# Patient Record
Sex: MALE | Race: WHITE | Employment: FULL TIME | URBAN - METROPOLITAN AREA
[De-identification: names, ages, dates, MRNs, and addresses within clinical notes are randomized per-mention and may not be internally consistent; named-entity substitution may affect disease eponyms.]

---

## 2017-03-01 ENCOUNTER — HOSPITAL ENCOUNTER (EMERGENCY)
Facility: CLINIC | Age: 27
Discharge: HOME | End: 2017-03-01
Attending: FAMILY MEDICINE | Admitting: FAMILY MEDICINE
Payer: MEDICAID

## 2017-03-01 VITALS
RESPIRATION RATE: 14 BRPM | BODY MASS INDEX: 23.53 KG/M2 | HEART RATE: 84 BPM | OXYGEN SATURATION: 98 % | TEMPERATURE: 98 F | SYSTOLIC BLOOD PRESSURE: 131 MMHG | DIASTOLIC BLOOD PRESSURE: 70 MMHG | WEIGHT: 164 LBS

## 2017-03-01 DIAGNOSIS — L02.415 CUTANEOUS ABSCESS OF RIGHT LOWER EXTREMITY: ICD-10-CM

## 2017-03-01 DIAGNOSIS — L73.2 HIDRADENITIS SUPPURATIVA: ICD-10-CM

## 2017-03-01 PROCEDURE — 99283 EMERGENCY DEPT VISIT LOW MDM: CPT | Mod: 25

## 2017-03-01 PROCEDURE — 10060 I&D ABSCESS SIMPLE/SINGLE: CPT | Performed by: FAMILY MEDICINE

## 2017-03-01 PROCEDURE — 10060 I&D ABSCESS SIMPLE/SINGLE: CPT

## 2017-03-01 PROCEDURE — 25000125 ZZHC RX 250: Performed by: FAMILY MEDICINE

## 2017-03-01 PROCEDURE — 99284 EMERGENCY DEPT VISIT MOD MDM: CPT | Mod: 25 | Performed by: FAMILY MEDICINE

## 2017-03-01 RX ORDER — BUPIVACAINE HYDROCHLORIDE AND EPINEPHRINE 2.5; 5 MG/ML; UG/ML
1 INJECTION, SOLUTION INFILTRATION; PERINEURAL ONCE
Status: COMPLETED | OUTPATIENT
Start: 2017-03-01 | End: 2017-03-01

## 2017-03-01 RX ADMIN — BUPIVACAINE HYDROCHLORIDE AND EPINEPHRINE BITARTRATE 1 ML: 2.5; .005 INJECTION, SOLUTION INFILTRATION; PERINEURAL at 16:34

## 2017-03-01 ASSESSMENT — ENCOUNTER SYMPTOMS
WHEEZING: 0
SORE THROAT: 0
CHILLS: 0
BACK PAIN: 0
FEVER: 0
ABDOMINAL PAIN: 0

## 2017-03-01 NOTE — ED AVS SNAPSHOT
[facility] Emergency Department    [address] [place] [address]    Phone:  [phone]    Fax:  [phone]                                       Dinesh Weiss   MRN: [id number]    Department:  [facility] Emergency Department   Date of Visit:  3/1/2017           After Visit Summary Signature Page     I have received my discharge instructions, and my questions have been answered. I have discussed any challenges I see with this plan with the nurse or doctor.    ..........................................................................................................................................  Patient/Patient Representative Signature      ..........................................................................................................................................  Patient Representative Print Name and Relationship to Patient    ..................................................               ................................................  Date                                            Time    ..........................................................................................................................................  Reviewed by Signature/Title    ...................................................              ..............................................  Date                                                            Time

## 2017-03-01 NOTE — LETTER
[facility] EMERGENCY DEPARTMENT  [address] [place] [address]  [phone]    2017    Dinesh Weiss  [address]  [place] [address]  [phone] (home)     : 1990      To Whom it may concern:    Dinesh Weiss was seen in our Emergency Department today, 2017.  I expect his condition to improve over the next 1-2 days.  He may return to work/school when improved.    Sincerely,        Syed Rodriguez MD

## 2017-03-01 NOTE — ED PROVIDER NOTES
History     Chief Complaint   Patient presents with     Cyst     HPI  Dinesh Weiss is a 26 year old male who presents to the ED with a painful swelling to the back of his right thigh. Just below the buttock. He has had cysts in this area before which required incision and drainage. He has no fever. He has a long history of this. No injury to this area.    Allergies   Allergen Reactions     Penicillins      No current facility-administered medications for this encounter.      No current outpatient prescriptions on file.                 I have reviewed the Medications, Allergies, Past Medical and Surgical History, and Social History in the Epic system.    Review of Systems   Constitutional: Negative for chills and fever.   HENT: Negative for congestion and sore throat.    Respiratory: Negative for wheezing.    Cardiovascular: Negative for chest pain.   Gastrointestinal: Negative for abdominal pain.   Musculoskeletal: Negative for back pain.   Skin: Negative for rash.       Physical Exam   BP: 123/72  Heart Rate: 88  Temp: 98  F (36.7  C)  Resp: 14  Weight: 74.4 kg (164 lb)  SpO2: 98 %  Physical Exam   Constitutional: He is oriented to person, place, and time. He appears well-developed and well-nourished.   Pleasant 26-year-old who appears very anxious   HENT:   Head: Normocephalic and atraumatic.   Eyes: Conjunctivae are normal.   Neck: Normal range of motion. Neck supple.   Cardiovascular: Normal rate.    Pulmonary/Chest: Effort normal.   Musculoskeletal: Normal range of motion.   Neurological: He is alert and oriented to person, place, and time.   Skin: Skin is warm and dry. There is erythema.         Red Warm swollen 2 cm diameter cutaneous abscess. Draining foul-smelling purulent material after incision and drainage. Numerous scars in the same area from previous abscesses and drainages. No evidence of cellulitis.   Psychiatric: He has a normal mood and affect. His behavior is normal.   Nursing note and  "vitals reviewed.      ED Course     ED Course     Incision + drainage  Date/Time: 3/1/2017 4:32 PM  Performed by: ALEXANDR MONAE  Authorized by: ALEXANDR MONAE   Consent: Verbal consent obtained.  Risks and benefits: risks, benefits and alternatives were discussed  Consent given by: patient  Patient identity confirmed: verbally with patient and hospital-assigned identification number  Time out: Immediately prior to procedure a \"time out\" was called to verify the correct patient, procedure, equipment, support staff and site/side marked as required.  Type: abscess  Body area: lower extremity  Location details: right leg  Anesthesia: local infiltration    Anesthesia:  Anesthesia: local infiltration  Local Anesthetic: bupivacaine 0.25% with epinephrine   Anesthetic total: 3 mL  Sedation:  Patient sedated: no    Scalpel size: 11  Incision type: single straight  Incision depth: subcutaneous  Complexity: simple  Drainage: purulent  Drainage amount: moderate  Wound treatment: wound left open  Patient tolerance: Patient tolerated the procedure well with no immediate complications  Comments: Small cutaneous abscess was incised and drained. 2 small to allow any packing. The opening was made large and left open. Discussed trying to keep this open for the next 2 days to facilitate drainage.                   Critical Care time:  none               Labs Ordered and Resulted from Time of ED Arrival Up to the Time of Departure from the ED - No data to display    Assessments & Plan (with Medical Decision Making)   MDM--26-year-old male with a long history of cutaneous abscesses. These occasionally need incision and drainage. There is one on the back of his right leg just below the right buttock which has been increasing in size redness and pain over the last 2 days. This required incision and drainage that resulted in a moderate amount of foul-smelling purulent material. Scant amount of sebaceous material. The wound was left open. " Patient tolerated this ok.  I have reviewed the nursing notes.    I have reviewed the findings, diagnosis, plan and need for follow up with the patient.    New Prescriptions    No medications on file       Final diagnoses:   Hidradenitis suppurativa   Cutaneous abscess of right lower extremity       3/1/2017   [facility] EMERGENCY DEPARTMENT     Jennifer, Syed MUNGUIA MD  03/01/17 7521

## 2017-03-01 NOTE — ED AVS SNAPSHOT
[facility] Emergency Department    [address] [place] [address]    Phone:  [phone]    Fax:  [phone]                                       Dinesh Weiss   MRN: [id number]    Department:  [facility] Emergency Department   Date of Visit:  3/1/2017           Patient Information     Date Of Birth          1990        Your diagnoses for this visit were:     Hidradenitis suppurativa     Cutaneous abscess of right lower extremity        You were seen by Jennifer, Syed MUNGUIA MD.      Follow-up Information     Follow up with None.        Follow up with [facility] Emergency Department.    Specialty:  EMERGENCY MEDICINE    Why:  If symptoms worsen    Contact information:    [address]   [place] [address] [phone]    Additional information:    From [address]: Exit at [address] on south side of [place]. Turn right on [address]. Turn left at stoplight on [address]. [facility] will be in view two blocks ahead      Discharge References/Attachments     HIDRADENITIS SUPPURATIVA, INCISION AND DRAINAGE (ENGLISH)      24 Hour Appointment Hotline       To make an appointment at any [facility] clinic, call [phone] ([phone]). If you don't have a family doctor or clinic, we will help you find one. [facility] clinics are conveniently located to serve the needs of you and your family.             Review of your medicines      Notice     You have not been prescribed any medications.            Orders Needing Specimen Collection     None      Pending Results     No orders found from 2/27/2017 to 3/2/2017.            Pending Culture Results     No orders found from 2/27/2017 to 3/2/2017.            Thank you for choosing [facility]       Thank you for choosing [facility] for your care. Our goal is always to provide you with excellent care. Hearing back from our patients is one way we can continue to improve our services. Please take a  "few minutes to complete the written survey that you may receive in the mail after you visit with us. Thank you!        [employer] Information     [employer] lets you send messages to your doctor, view your test results, renew your prescriptions, schedule appointments and more. To sign up, go to [email or web address] . Click on \"Log in\" on the left side of the screen, which will take you to the Welcome page. Then click on \"Sign up Now\" on the right side of the page.     You will be asked to enter the access code listed below, as well as some personal information. Please follow the directions to create your username and password.     Your access code is: [email or web address]  Expires: 3/11/2017 11:28 PM     Your access code will  in 90 days. If you need help or a new code, please call your [facility] clinic or [phone].        Care EveryWhere ID     This is your Care EveryWhere ID. This could be used by other organizations to access your [facility] medical records  [id number]        After Visit Summary       This is your record. Keep this with you and show to your community pharmacist(s) and doctor(s) at your next visit.                  "

## 2017-03-01 NOTE — ED NOTES
Presents with abscess to right lower gluteal/upper posterior thigh. First noticed pain two days ago and has gotten worse

## 2021-09-15 ENCOUNTER — HOSPITAL ENCOUNTER (EMERGENCY)
Facility: CLINIC | Age: 31
Discharge: HOME | End: 2021-09-15
Attending: EMERGENCY MEDICINE | Admitting: EMERGENCY MEDICINE
Payer: MEDICAID

## 2021-09-15 VITALS
OXYGEN SATURATION: 98 % | DIASTOLIC BLOOD PRESSURE: 75 MMHG | TEMPERATURE: 98.1 F | BODY MASS INDEX: 24.48 KG/M2 | SYSTOLIC BLOOD PRESSURE: 108 MMHG | HEART RATE: 59 BPM | WEIGHT: 170.6 LBS | RESPIRATION RATE: 16 BRPM

## 2021-09-15 DIAGNOSIS — J02.9 PHARYNGITIS, UNSPECIFIED ETIOLOGY: ICD-10-CM

## 2021-09-15 LAB
DEPRECATED S PYO AG THROAT QL EIA: NEGATIVE
GROUP A STREP BY PCR: NOT DETECTED

## 2021-09-15 PROCEDURE — 99282 EMERGENCY DEPT VISIT SF MDM: CPT | Performed by: EMERGENCY MEDICINE

## 2021-09-15 PROCEDURE — 87651 STREP A DNA AMP PROBE: CPT | Performed by: EMERGENCY MEDICINE

## 2021-09-15 PROCEDURE — 99283 EMERGENCY DEPT VISIT LOW MDM: CPT | Performed by: EMERGENCY MEDICINE

## 2021-09-15 NOTE — ED TRIAGE NOTES
Pt presents with throat pain and swollen glands. Started two days ago. Pt had similar symptoms on 09/03/2021 covid negative then.

## 2021-09-15 NOTE — LETTER
September 15, 2021      To Whom It May Concern:      Dinesh Weiss was seen in our Emergency Department today, 09/15/21. .  He may return to work tomorrow.      Sincerely,        Sathya Hodge MD

## 2021-09-15 NOTE — ED PROVIDER NOTES
History     Chief Complaint   Patient presents with     Throat Pain     HPI  Dinesh Weiss is a 31 year old male who presents with a sore throat. This began 3 days ago. He is had a slight cough over the last 2 weeks as well. Pain this morning in the throat was sharp bilateral and lower. No fever. He had a negative Covid test 12 days ago. He needs a work note for today.    Allergies:  Allergies   Allergen Reactions     Penicillins        Problem List:    There are no problems to display for this patient.       Past Medical History:    No past medical history on file.    Past Surgical History:    Past Surgical History:   Procedure Laterality Date     TONSILLECTOMY         Family History:    No family history on file.    Social History:  Marital Status:  Single [1]  Social History     Tobacco Use     Smoking status: Current Every Day Smoker     Packs/day: 1.00   Substance Use Topics     Alcohol use: No     Comment: Rare     Drug use: No        Medications:    No current outpatient medications on file.        Review of Systems  All other systems are reviewed and are negative    Physical Exam   BP: 108/75  Pulse: 59  Temp: 98.1  F (36.7  C)  Resp: 16  Weight: 77.4 kg (170 lb 9.6 oz)  SpO2: 98 %      Physical Exam  Vitals and nursing note reviewed.   Constitutional:       General: He is not in acute distress.     Appearance: He is well-developed. He is not diaphoretic.   HENT:      Head: Normocephalic and atraumatic.   Eyes:      General: No scleral icterus.        Right eye: No discharge.         Left eye: No discharge.      Conjunctiva/sclera: Conjunctivae normal.   Cardiovascular:      Rate and Rhythm: Normal rate and regular rhythm.      Heart sounds: Normal heart sounds. No murmur heard.     Pulmonary:      Effort: Pulmonary effort is normal. No respiratory distress.      Breath sounds: Normal breath sounds. No stridor.   Abdominal:      Palpations: Abdomen is soft.      Tenderness: There is no abdominal  tenderness.   Musculoskeletal:         General: Normal range of motion.      Cervical back: Normal range of motion and neck supple.   Skin:     General: Skin is warm and dry.      Coloration: Skin is not pale.      Findings: No erythema or rash.   Neurological:      Mental Status: He is alert.      Cranial Nerves: No cranial nerve deficit.      Motor: No abnormal muscle tone.         ED Course        Procedures              Critical Care time:  none               Results for orders placed or performed during the hospital encounter of 09/15/21 (from the past 24 hour(s))   Streptococcus A Rapid Screen w/Reflex to PCR    Specimen: Throat; Swab   Result Value Ref Range    Group A Strep antigen Negative Negative       Medications - No data to display    Assessments & Plan (with Medical Decision Making)  31-year-old with pharyngitis. Strep test negative. Symptomatic care advised.     I have reviewed the nursing notes.    I have reviewed the findings, diagnosis, plan and need for follow up with the patient.       New Prescriptions    No medications on file       Final diagnoses:   Pharyngitis, unspecified etiology       9/15/2021   [facility] EMERGENCY DEPT     Sathya Hodge MD  09/15/21 9358

## 2021-09-16 NOTE — RESULT ENCOUNTER NOTE
Group A Streptococcus PCR is NEGATIVE  No treatment or change in treatment [facility] ED lab result Strep Group A protocol.

## 2021-11-16 ENCOUNTER — HOSPITAL ENCOUNTER (EMERGENCY)
Facility: CLINIC | Age: 31
Discharge: HOME | End: 2021-11-16
Attending: PHYSICIAN ASSISTANT | Admitting: PHYSICIAN ASSISTANT
Payer: MEDICAID

## 2021-11-16 VITALS
OXYGEN SATURATION: 99 % | WEIGHT: 171 LBS | BODY MASS INDEX: 24.54 KG/M2 | HEART RATE: 50 BPM | SYSTOLIC BLOOD PRESSURE: 121 MMHG | TEMPERATURE: 98.3 F | DIASTOLIC BLOOD PRESSURE: 79 MMHG | RESPIRATION RATE: 18 BRPM

## 2021-11-16 DIAGNOSIS — Z20.822 SUSPECTED 2019 NOVEL CORONAVIRUS INFECTION: ICD-10-CM

## 2021-11-16 LAB
FLUAV RNA SPEC QL NAA+PROBE: NEGATIVE
FLUBV RNA RESP QL NAA+PROBE: NEGATIVE
SARS-COV-2 RNA RESP QL NAA+PROBE: NEGATIVE

## 2021-11-16 PROCEDURE — 87636 SARSCOV2 & INF A&B AMP PRB: CPT | Performed by: PHYSICIAN ASSISTANT

## 2021-11-16 PROCEDURE — 99282 EMERGENCY DEPT VISIT SF MDM: CPT | Performed by: PHYSICIAN ASSISTANT

## 2021-11-16 PROCEDURE — 99283 EMERGENCY DEPT VISIT LOW MDM: CPT | Performed by: PHYSICIAN ASSISTANT

## 2021-11-16 PROCEDURE — C9803 HOPD COVID-19 SPEC COLLECT: HCPCS | Performed by: PHYSICIAN ASSISTANT

## 2021-11-16 NOTE — ED PROVIDER NOTES
History     Chief Complaint   Patient presents with     Covid Concern     HPI  Dinesh Weiss is a 31 year old male who presents to the emergency department complaining of Covid symptoms.  He reports for the last 2 to 3 days he has experienced body aches, cough, sore throat, loss of smell, diarrhea, and abdominal soreness.  He has not taken anything for his symptoms.  He denies shortness of breath.  He denies nausea or vomiting.  He states he feels very fatigued and has been unable to go hunting because of it.  He is a smoker but is only smoked a few cigarettes since getting sick.    Allergies:  Allergies   Allergen Reactions     Penicillins        Problem List:    There are no problems to display for this patient.       Past Medical History:    History reviewed. No pertinent past medical history.    Past Surgical History:    Past Surgical History:   Procedure Laterality Date     TONSILLECTOMY         Family History:    History reviewed. No pertinent family history.    Social History:  Marital Status:  Single [1]  Social History     Tobacco Use     Smoking status: Current Every Day Smoker     Packs/day: 1.00     Smokeless tobacco: Never Used   Substance Use Topics     Alcohol use: No     Comment: Rare     Drug use: No        Medications:    No current outpatient medications on file.        Review of Systems   All other systems reviewed and are negative.      Physical Exam   BP: 121/79  Pulse: 50  Temp: 98.3  F (36.8  C)  Resp: 18  Weight: 77.6 kg (171 lb)  SpO2: 99 %      Physical Exam  Vitals and nursing note reviewed.   Constitutional:       General: He is not in acute distress.     Appearance: Normal appearance. He is well-developed. He is ill-appearing. He is not diaphoretic.   HENT:      Head: Normocephalic and atraumatic.      Nose: Nose normal.      Mouth/Throat:      Mouth: Mucous membranes are moist.      Pharynx: Oropharynx is clear. No oropharyngeal exudate or posterior oropharyngeal erythema.    Eyes:      Conjunctiva/sclera: Conjunctivae normal.      Pupils: Pupils are equal, round, and reactive to light.   Cardiovascular:      Rate and Rhythm: Normal rate and regular rhythm.      Heart sounds: Normal heart sounds.   Pulmonary:      Effort: Pulmonary effort is normal. No respiratory distress.      Breath sounds: Wheezing (scant, cleared with cough) present.   Abdominal:      General: Bowel sounds are normal. There is no distension.      Palpations: Abdomen is soft.      Tenderness: There is no abdominal tenderness.   Musculoskeletal:         General: No deformity.      Cervical back: Neck supple.   Skin:     General: Skin is warm and dry.   Neurological:      General: No focal deficit present.      Mental Status: He is alert and oriented to person, place, and time. Mental status is at baseline.      Coordination: Coordination normal.   Psychiatric:         Mood and Affect: Mood normal.         Behavior: Behavior normal.         Thought Content: Thought content normal.         ED Course        Procedures      No results found for this or any previous visit (from the past 24 hour(s)).    Medications - No data to display       Assessments & Plan (with Medical Decision Making)  Dinehs Weiss is a 31 year old male who presents to the ED with cough, body aches, sore throat, and diarrhea for the last 2 to 3 days.  Denies shortness of breath.  On arrival to the ED his vital signs were within normal limits, O2 saturations 99% on room air, afebrile.  Exam demonstrated that he was ill-appearing but otherwise was reassuring.  He did have some scant wheezing but that cleared with coughing and overall he had good airflow throughout lung fields.    Based on his symptomology, I do suspect he has COVID-19.  He is not vaccinated.I discussed this potential diagnosis with the patient.  He was agreeable to testing so this was collected today.  Otherwise I do not see a need for extensive work-up at this point given his  reassuring exam.  He was comfortable with plan to discharge home and treat things symptomatically with Tylenol or ibuprofen as needed, fluids, and rest.  He was given instructions on when to return to the ED.  He will check my chart for his COVID-19 results.  Discussed proper isolation with the patient as well.  All questions were answered and patient was discharged home in stable condition.     I have reviewed the nursing notes.    I have reviewed the findings, diagnosis, plan and need for follow up with the patient.    New Prescriptions    No medications on file       Final diagnoses:   Suspected 2019 novel coronavirus infection     Note: Chart documentation done in part with Dragon Voice Recognition software. Although reviewed after completion, some word and grammatical errors may remain.    11/16/2021   [facility] EMERGENCY DEPT     Minna Rodríguez PA-C  11/16/21 0803

## 2021-11-16 NOTE — DISCHARGE INSTRUCTIONS
I suspect that you have contracted COVID-19.  This illness can last 10 to 14 days.  Please treat your body aches with Tylenol or ibuprofen.  Drink lots of fluids and rest.  Isolate yourself from others to prevent the spread of illness.  If you develop any worsening symptoms please return to the emergency department.    Thank you for choosing [facility]'s Emergency Department. It was a pleasure taking care of you today. If you have any questions, please call [phone].    Minna Rodríguez PA-C

## 2021-11-16 NOTE — LETTER
November 16, 2021      Dinesh Weiss  [address] [place] [address]        To Whom It May Concern:    Dinesh Weiss  was seen on 11/16/2021.  He has likely COVID-19 and will need to be excused from work for the next 10 to 14 days.      Sincerely,        Minna Rodríguez PA-C

## 2021-12-04 ENCOUNTER — HEALTH MAINTENANCE LETTER (OUTPATIENT)
Age: 31
End: 2021-12-04

## 2022-09-11 ENCOUNTER — HEALTH MAINTENANCE LETTER (OUTPATIENT)
Age: 32
End: 2022-09-11

## 2023-01-23 ENCOUNTER — HEALTH MAINTENANCE LETTER (OUTPATIENT)
Age: 33
End: 2023-01-23

## 2024-02-24 ENCOUNTER — HEALTH MAINTENANCE LETTER (OUTPATIENT)
Age: 34
End: 2024-02-24